# Patient Record
Sex: MALE | Race: OTHER | HISPANIC OR LATINO | URBAN - METROPOLITAN AREA
[De-identification: names, ages, dates, MRNs, and addresses within clinical notes are randomized per-mention and may not be internally consistent; named-entity substitution may affect disease eponyms.]

---

## 2020-09-18 ENCOUNTER — EMERGENCY (EMERGENCY)
Facility: HOSPITAL | Age: 25
LOS: 1 days | Discharge: HOME | End: 2020-09-18
Attending: EMERGENCY MEDICINE
Payer: COMMERCIAL

## 2020-09-18 VITALS
DIASTOLIC BLOOD PRESSURE: 78 MMHG | HEIGHT: 67 IN | SYSTOLIC BLOOD PRESSURE: 117 MMHG | OXYGEN SATURATION: 98 % | HEART RATE: 74 BPM | WEIGHT: 179.9 LBS | TEMPERATURE: 98 F | RESPIRATION RATE: 18 BRPM

## 2020-09-18 VITALS
RESPIRATION RATE: 20 BRPM | DIASTOLIC BLOOD PRESSURE: 77 MMHG | SYSTOLIC BLOOD PRESSURE: 120 MMHG | HEART RATE: 80 BPM | TEMPERATURE: 98 F | OXYGEN SATURATION: 98 %

## 2020-09-18 LAB
APPEARANCE UR: CLEAR — SIGNIFICANT CHANGE UP
BACTERIA # UR AUTO: ABNORMAL /HPF
BILIRUB UR-MCNC: NEGATIVE — SIGNIFICANT CHANGE UP
COLOR SPEC: YELLOW — SIGNIFICANT CHANGE UP
COMMENT - URINE: SIGNIFICANT CHANGE UP
DIFF PNL FLD: ABNORMAL
EPI CELLS # UR: SIGNIFICANT CHANGE UP /HPF
GLUCOSE UR QL: NEGATIVE — SIGNIFICANT CHANGE UP
HYALINE CASTS # UR AUTO: ABNORMAL /LPF
KETONES UR-MCNC: ABNORMAL
LEUKOCYTE ESTERASE UR-ACNC: ABNORMAL
NITRITE UR-MCNC: NEGATIVE — SIGNIFICANT CHANGE UP
PH UR: 5 — SIGNIFICANT CHANGE UP (ref 5–8)
PROT UR-MCNC: 30 MG/DL
RBC CASTS # UR COMP ASSIST: ABNORMAL /HPF (ref 0–2)
SP GR SPEC: 1.02 — SIGNIFICANT CHANGE UP (ref 1.01–1.02)
UROBILINOGEN FLD QL: NEGATIVE — SIGNIFICANT CHANGE UP
WBC UR QL: SIGNIFICANT CHANGE UP /HPF (ref 0–5)

## 2020-09-18 PROCEDURE — 81001 URINALYSIS AUTO W/SCOPE: CPT

## 2020-09-18 PROCEDURE — 74176 CT ABD & PELVIS W/O CONTRAST: CPT

## 2020-09-18 PROCEDURE — 99283 EMERGENCY DEPT VISIT LOW MDM: CPT

## 2020-09-18 PROCEDURE — 74176 CT ABD & PELVIS W/O CONTRAST: CPT | Mod: 26

## 2020-09-18 PROCEDURE — 99284 EMERGENCY DEPT VISIT MOD MDM: CPT | Mod: 25

## 2020-09-18 NOTE — ED PROVIDER NOTE - OBJECTIVE STATEMENT
24 year old male presenting to the ED with exacerbation of chronic right sided abdominal pain (no diagnosis) and back pain in the past few days. States that he has been passing bloody urine for the last 24 hours which is now clearing up. Denies any radiation of the back pain to the testicles. Denies fever, chills, or any other complaints.

## 2020-09-18 NOTE — ED ADULT NURSE NOTE - DISCHARGE DATE/TIME
Encounter addended by: Aravind Bolaños EP on: 10/26/2018  8:51 AM<BR>     Actions taken: Sign clinical note, Episode resolved
18-Sep-2020 17:40

## 2020-09-18 NOTE — ED PROVIDER NOTE - AGGRAVATING FACTORS
Anesthetic History   No history of anesthetic complications            Review of Systems / Medical History  Patient summary reviewed and pertinent labs reviewed    Pulmonary        Sleep apnea           Neuro/Psych   Within defined limits           Cardiovascular  Within defined limits                Exercise tolerance: >4 METS     GI/Hepatic/Renal  Within defined limits              Endo/Other  Within defined limits           Other Findings              Physical Exam    Airway  Mallampati: I  TM Distance: 4 - 6 cm  Neck ROM: normal range of motion   Mouth opening: Normal     Cardiovascular    Rhythm: regular  Rate: normal         Dental    Dentition: Caps/crowns     Pulmonary  Breath sounds clear to auscultation               Abdominal  GI exam deferred       Other Findings            Anesthetic Plan    ASA: 2  Anesthesia type: general          Induction: Intravenous  Anesthetic plan and risks discussed with: Patient none

## 2020-09-18 NOTE — ED PROVIDER NOTE - CARE PROVIDER_API CALL
Rashaun Patel J  UROLOGY  [address] [facility], [address] [place] [address]  Phone: [phone]  Fax: [phone]  Follow Up Time:

## 2020-09-18 NOTE — ED PROVIDER NOTE - CLINICAL SUMMARY MEDICAL DECISION MAKING FREE TEXT BOX
Patient presenting with hematuria x24 hours and abdominal pain for the past few days. Will order CT, urine analysis, and reassess.

## 2020-09-18 NOTE — ED ADULT TRIAGE NOTE - CHIEF COMPLAINT QUOTE
came in w/ c/o Rt abdominal pain  on and off but yesterday pain was  worse started vomiting also with blood in the urine.

## 2021-07-17 ENCOUNTER — EMERGENCY (EMERGENCY)
Facility: HOSPITAL | Age: 26
LOS: 1 days | Discharge: HOME | End: 2021-07-17
Attending: EMERGENCY MEDICINE
Payer: SELF-PAY

## 2021-07-17 VITALS
HEIGHT: 67 IN | DIASTOLIC BLOOD PRESSURE: 71 MMHG | WEIGHT: 164.91 LBS | SYSTOLIC BLOOD PRESSURE: 121 MMHG | OXYGEN SATURATION: 99 % | HEART RATE: 94 BPM | RESPIRATION RATE: 16 BRPM | TEMPERATURE: 98 F

## 2021-07-17 PROBLEM — Z78.9 OTHER SPECIFIED HEALTH STATUS: Chronic | Status: ACTIVE | Noted: 2020-09-23

## 2021-07-17 PROCEDURE — 96372 THER/PROPH/DIAG INJ SC/IM: CPT

## 2021-07-17 PROCEDURE — 99284 EMERGENCY DEPT VISIT MOD MDM: CPT

## 2021-07-17 PROCEDURE — 72100 X-RAY EXAM L-S SPINE 2/3 VWS: CPT

## 2021-07-17 PROCEDURE — 99283 EMERGENCY DEPT VISIT LOW MDM: CPT | Mod: 25

## 2021-07-17 PROCEDURE — 72100 X-RAY EXAM L-S SPINE 2/3 VWS: CPT | Mod: 26

## 2021-07-17 RX ORDER — METHOCARBAMOL 500 MG/1
2 TABLET, FILM COATED ORAL
Qty: 18 | Refills: 0
Start: 2021-07-17 | End: 2021-07-19

## 2021-07-17 RX ORDER — KETOROLAC TROMETHAMINE 30 MG/ML
30 SYRINGE (ML) INJECTION ONCE
Refills: 0 | Status: DISCONTINUED | OUTPATIENT
Start: 2021-07-17 | End: 2021-07-17

## 2021-07-17 RX ORDER — IBUPROFEN 200 MG
1 TABLET ORAL
Qty: 20 | Refills: 0
Start: 2021-07-17

## 2021-07-17 RX ADMIN — Medication 30 MILLIGRAM(S): at 10:37

## 2021-07-17 NOTE — ED PROVIDER NOTE - OBJECTIVE STATEMENT
25 year old male with no significant PMHx or PSHx presents to the ED with complaints of acute onset of left lower back pain and numbness to his left lateral thigh. Patient reports that he works as an , and states that his pain began two days ago while he was at work in [place]. Patient reports that the pain started after he moved after being bent over for forty-five minutes. Patient states that waited to see if the pain would resolve on its own. Patient denies having any history of back issues and denies any falls, bowel or bladder symptoms, and all other acute complaints. Patient notes that he did not take any medications to manage his pain prior to arrival. NKDA.

## 2021-07-17 NOTE — ED PROVIDER NOTE - MUSCULOSKELETAL, MLM
Left-sided paraspinal lumbar tenderness diffusely. Decreased sensation to the left lateral thigh. Muscle strength 5/5 with hip flexion and knee extension. Left-sided paraspinal lumbar tenderness diffusely. Muscle strength 5/5 with hip flexion and knee extension.

## 2021-07-17 NOTE — ED PROVIDER NOTE - NSFOLLOWUPINSTRUCTIONS_ED_ALL_ED_FT
Lumbosacral Strain      Lumbosacral strain is an injury that causes pain in the lower back (lumbosacral spine). This injury usually happens from overstretching the muscles or ligaments along your spine. Ligaments are cord-like tissues that connect bones to other bones. A strain can affect one or more muscles or ligaments.      What are the causes?  This condition may be caused by:  •A hard, direct hit to the back.    •Overstretching the lower back muscles. This may result from:  •A fall.      •Lifting something heavy.      •Repetitive movements such as bending or crouching.          What increases the risk?  The following factors may make you more likely to develop this condition:•Participating in sports or activities that involve:  •A sudden twist of the back.      •Pushing or pulling motions.        •Being overweight or obese.      •Having poor strength and flexibility, especially tight hamstrings or weak muscles in the back or abdomen.      •Having too much of a curve in the lower back.      •Having a pelvis that is tilted forward.        What are the signs or symptoms?    The main symptom of this condition is pain in the lower back, at the site of the strain. Pain may also be felt down one or both legs.      How is this diagnosed?    This condition is diagnosed based on your symptoms, your medical history, and a physical exam. During the physical exam, your health care provider may push on certain areas of your back to find the source of your pain.    You may be asked to bend forward, backward, and side to side to check your pain and range of motion. You may also have imaging tests, such as X-rays and an MRI.      How is this treated?  This condition may be treated by:  •Applying heat and cold on the affected area.      •Taking medicines to help relieve pain and relax your muscles.      •Taking NSAIDs, such as ibuprofen, to help reduce swelling and discomfort.      •Doing stretching and strengthening exercises for your lower back.      Symptoms usually improve within several weeks of treatment. However, recovery time varies. When your symptoms improve, gradually return to your normal routine as soon as possible to reduce pain, avoid stiffness, and keep muscle strength.      Follow these instructions at home:    Medicines     •Take over-the-counter and prescription medicines only as told by your health care provider.    •Ask your health care provider if the medicine prescribed to you:  •Requires you to avoid driving or using heavy machinery.    •Can cause constipation. You may need to take these actions to prevent or treat constipation:  •Drink enough fluid to keep your urine pale yellow.      •Take over-the-counter or prescription medicines.      •Eat foods that are high in fiber, such as beans, whole grains, and fresh fruits and vegetables.      •Limit foods that are high in fat and processed sugars, such as fried or sweet foods.            Managing pain, stiffness, and swelling                 •If directed, put ice on the injured area. To do this:  •Put ice in a plastic bag.      •Place a towel between your skin and the bag.      •Leave the ice on for 20 minutes, 2–3 times a day.      •If directed, apply heat on the affected area as often as told by your health care provider. Use the heat source that your health care provider recommends, such as a moist heat pack or a heating pad.  •Place a towel between your skin and the heat source.      •Leave the heat on for 20–30 minutes.      •Remove the heat if your skin turns bright red. This is especially important if you are unable to feel pain, heat, or cold. You may have a greater risk of getting burned.        Activity     •Rest as told by your health care provider.      • Do not stay in bed. Staying in bed for more than 1–2 days can delay your recovery.      •Return to your normal activities as told by your health care provider. Ask your health care provider what activities are safe for you.      •Avoid activities that take a lot of energy for as long as told by your health care provider.      •Do exercises as told by your health care provider. This includes stretching and strengthening exercises.      General instructions     •Sit up and stand up straight. Avoid leaning forward when you sit, or hunching over when you stand.      • Do not use any products that contain nicotine or tobacco, such as cigarettes, e-cigarettes, and chewing tobacco. If you need help quitting, ask your health care provider.      •Keep all follow-up visits as told by your health care provider. This is important.        How is this prevented?     •Use correct form when playing sports and lifting heavy objects.      •Use good posture when sitting and standing.      •Maintain a healthy weight.      •Sleep on a mattress with medium firmness to support your back.      •Do at least 150 minutes of moderate-intensity exercise each week, such as brisk walking or water aerobics. Try a form of exercise that takes stress off your back, such as swimming or stationary cycling.    •Maintain physical fitness, including:  •Strength.      •Flexibility.          Contact a health care provider if:    •Your back pain does not improve after several weeks of treatment.      •Your symptoms get worse.        Get help right away if:    •Your back pain is severe.      •You cannot stand or walk.      •You have difficulty controlling when you urinate or when you have a bowel movement.      •You feel nauseous or you vomit.      •Your feet or legs get very cold, turn pale, or look blue.      •You have numbness, tingling, weakness, or problems using your arms or legs.    •You develop any of the following:  •Shortness of breath.      •Dizziness.      •Pain in your legs.      •Weakness in your buttocks or legs.          Summary    •Lumbosacral strain is an injury that causes pain in the lower back (lumbosacral spine).      •This injury usually happens from overstretching the muscles or ligaments along your spine.      •This condition may be caused by a direct hit to the lower back or by overstretching the lower back muscles.      •Symptoms usually improve within several weeks of treatment.      This information is not intended to replace advice given to you by your health care provider. Make sure you discuss any questions you have with your health care provider.

## 2021-07-17 NOTE — ED PROVIDER NOTE - CLINICAL SUMMARY MEDICAL DECISION MAKING FREE TEXT BOX
25 year old male with acute onset of left lower back pain. Normal muscle strength on exam. Suggest lumbosacral strain. Patient given hot packs and Toradol for pain. Will arrange followup with spine specialist.

## 2021-07-17 NOTE — ED PROVIDER NOTE - PATIENT PORTAL LINK FT
You can access the FollowMyHealth Patient Portal offered by [facility] by registering at the following website: [email or web address]. By joining [employer]’s FollowMyHealth portal, you will also be able to view your health information using other applications (apps) compatible with our system.

## 2021-07-17 NOTE — ED ADULT NURSE NOTE - NSIMPLEMENTINTERV_GEN_ALL_ED
Implemented All Universal Safety Interventions:  [place] to call system. Call bell, personal items and telephone within reach. Instruct patient to call for assistance. Room bathroom lighting operational. Non-slip footwear when patient is off stretcher. Physically safe environment: no spills, clutter or unnecessary equipment. Stretcher in lowest position, wheels locked, appropriate side rails in place.

## 2021-07-17 NOTE — ED PROVIDER NOTE - NEUROLOGICAL, MLM
Alert and oriented, no focal deficits, no motor or sensory deficits. Decreased sensation to the left lateral thigh.

## 2023-02-01 NOTE — ED PROVIDER NOTE - PATIENT PORTAL LINK FT
You can access the FollowMyHealth Patient Portal offered by [facility] by registering at the following website: [email or web address]. By joining [employer]’s FollowMyHealth portal, you will also be able to view your health information using other applications (apps) compatible with our system.
English